# Patient Record
Sex: FEMALE | Race: OTHER | HISPANIC OR LATINO | URBAN - METROPOLITAN AREA
[De-identification: names, ages, dates, MRNs, and addresses within clinical notes are randomized per-mention and may not be internally consistent; named-entity substitution may affect disease eponyms.]

---
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
## 2025-04-25 NOTE — CONSULT NOTE ADULT - CONSULT REQUESTED BY NAME
Care Due:                  Date            Visit Type   Department     Provider  --------------------------------------------------------------------------------                                ESTABLISHED                              PATIENT -    [facility] INTERNAL  Tien Nicholson  Last Visit: 04-      [place]      [facility]  Next Visit: None Scheduled  None         None Found                                                            Last  Test          Frequency    Reason                     Performed    Due Date  --------------------------------------------------------------------------------    Office Visit  15 months..  EScitalopram, amLODIPine,   04- 06-                             losartan, metoprolol.....    CMP.........  12 months..  losartan.................  05- 04-    [facility] Embedded Care Due Messages. Reference number: [id number].   4/25/2025 3:17:27 PM CDT  
OB
